# Patient Record
Sex: MALE | Race: WHITE | NOT HISPANIC OR LATINO | URBAN - METROPOLITAN AREA
[De-identification: names, ages, dates, MRNs, and addresses within clinical notes are randomized per-mention and may not be internally consistent; named-entity substitution may affect disease eponyms.]

---

## 2019-11-17 ENCOUNTER — EMERGENCY (EMERGENCY)
Facility: HOSPITAL | Age: 24
LOS: 1 days | Discharge: HOME | End: 2019-11-17
Admitting: EMERGENCY MEDICINE
Payer: COMMERCIAL

## 2019-11-17 VITALS
OXYGEN SATURATION: 98 % | HEIGHT: 71 IN | SYSTOLIC BLOOD PRESSURE: 116 MMHG | WEIGHT: 165.35 LBS | RESPIRATION RATE: 16 BRPM | TEMPERATURE: 98 F | HEART RATE: 120 BPM | DIASTOLIC BLOOD PRESSURE: 85 MMHG

## 2019-11-17 PROCEDURE — 99283 EMERGENCY DEPT VISIT LOW MDM: CPT | Mod: 25

## 2019-11-17 PROCEDURE — 12011 RPR F/E/E/N/L/M 2.5 CM/<: CPT

## 2019-11-17 NOTE — ED ADULT NURSE NOTE - NSIMPLEMENTINTERV_GEN_ALL_ED
Implemented All Universal Safety Interventions:  [place] to call system. Call bell, personal items and telephone within reach. Instruct patient to call for assistance. Room bathroom lighting operational. Non-slip footwear when patient is off stretcher. Physically safe environment: no spills, clutter or unnecessary equipment. Stretcher in lowest position, wheels locked, appropriate side rails in place.
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
## 2019-11-17 NOTE — ED PROVIDER NOTE - NSFOLLOWUPINSTRUCTIONS_ED_ALL_ED_FT
DISSOLVABLE SUTURES  * Please note minor swelling, redness, pain, itching, and occasional minor bleeding (that’s controlled by direct pressure) are common with all wounds and normally will go away as wound heals     • Keep wound clean and dry at all times until your follow up visit (water will prematurely break down dissolvable sutures)  • May use damp cloth to gently clean wound if necessary but ALWAYS PAT DRY  • Follow up at instructed time for wound check     PLEASE SEEK MEDICAL ATTENTION OR RETURN TO ER IMMEDIATELY IF:  * Swelling, redness, or pain increases and cannot be controlled by prescribed pain medication  * Wound feels warm to touch   * Fever >100.4F  * Wound edges reopen/separate   * Foul smelling discharge from wound   * Uncontrolled bleeding with direct pressure  * Increased numbness/tingling/discoloration of wound site
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
## 2019-11-17 NOTE — ED PROVIDER NOTE - CLINICAL SUMMARY MEDICAL DECISION MAKING FREE TEXT BOX
Laceration repaired without complication. Wound care instructions provided. Strict return precautions reviewed with pt in which pt verbalizes understanding and agrees to.

## 2019-11-17 NOTE — ED PROVIDER NOTE - PATIENT PORTAL LINK FT
You can access the FollowMyHealth Patient Portal offered by [facility] by registering at the following website: [email or web address]. By joining [facility]’s FollowMyHealth portal, you will also be able to view your health information using other applications (apps) compatible with our system.
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
## 2019-11-23 DIAGNOSIS — S01.112A LACERATION WITHOUT FOREIGN BODY OF LEFT EYELID AND PERIOCULAR AREA, INITIAL ENCOUNTER: ICD-10-CM

## 2019-11-23 DIAGNOSIS — T74.11XA ADULT PHYSICAL ABUSE, CONFIRMED, INITIAL ENCOUNTER: ICD-10-CM
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
